# Patient Record
Sex: MALE | Race: WHITE | NOT HISPANIC OR LATINO | Employment: FULL TIME | URBAN - METROPOLITAN AREA
[De-identification: names, ages, dates, MRNs, and addresses within clinical notes are randomized per-mention and may not be internally consistent; named-entity substitution may affect disease eponyms.]

---

## 2018-01-31 DIAGNOSIS — Z00.00 ROUTINE GENERAL MEDICAL EXAMINATION AT A HEALTH CARE FACILITY: Primary | ICD-10-CM

## 2018-02-16 ENCOUNTER — CLINICAL SUPPORT (OUTPATIENT)
Dept: INTERNAL MEDICINE | Facility: CLINIC | Age: 28
End: 2018-02-16

## 2018-02-16 ENCOUNTER — CLINICAL SUPPORT (OUTPATIENT)
Dept: INFECTIOUS DISEASES | Facility: CLINIC | Age: 28
End: 2018-02-16

## 2018-02-16 ENCOUNTER — HOSPITAL ENCOUNTER (OUTPATIENT)
Dept: CARDIOLOGY | Facility: CLINIC | Age: 28
Discharge: HOME | End: 2018-02-16

## 2018-02-16 ENCOUNTER — HOSPITAL ENCOUNTER (OUTPATIENT)
Dept: RADIOLOGY | Facility: HOSPITAL | Age: 28
Discharge: HOME | End: 2018-02-16
Attending: INTERNAL MEDICINE

## 2018-02-16 ENCOUNTER — OFFICE VISIT (OUTPATIENT)
Dept: INTERNAL MEDICINE | Facility: CLINIC | Age: 28
End: 2018-02-16

## 2018-02-16 VITALS
BODY MASS INDEX: 24.33 KG/M2 | WEIGHT: 155 LBS | HEIGHT: 67 IN | OXYGEN SATURATION: 97 % | SYSTOLIC BLOOD PRESSURE: 116 MMHG | DIASTOLIC BLOOD PRESSURE: 69 MMHG | HEART RATE: 44 BPM

## 2018-02-16 DIAGNOSIS — Z00.00 ROUTINE GENERAL MEDICAL EXAMINATION AT A HEALTH CARE FACILITY: Primary | ICD-10-CM

## 2018-02-16 DIAGNOSIS — Z00.00 ROUTINE GENERAL MEDICAL EXAMINATION AT A HEALTH CARE FACILITY: ICD-10-CM

## 2018-02-16 DIAGNOSIS — Z00.00 HEALTH CARE MAINTENANCE: Primary | ICD-10-CM

## 2018-02-16 LAB
ALBUMIN SERPL BCP-MCNC: 4.2 G/DL
ALP SERPL-CCNC: 92 U/L
ALT SERPL W/O P-5'-P-CCNC: 15 U/L
ANION GAP SERPL CALC-SCNC: 8 MMOL/L
AST SERPL-CCNC: 20 U/L
BILIRUB SERPL-MCNC: 0.4 MG/DL
BUN SERPL-MCNC: 16 MG/DL
CALCIUM SERPL-MCNC: 9.9 MG/DL
CHLORIDE SERPL-SCNC: 104 MMOL/L
CHOLEST SERPL-MCNC: 171 MG/DL
CHOLEST/HDLC SERPL: 3 {RATIO}
CO2 SERPL-SCNC: 26 MMOL/L
CREAT SERPL-MCNC: 1.2 MG/DL
ERYTHROCYTE [DISTWIDTH] IN BLOOD BY AUTOMATED COUNT: 12.1 %
EST. GFR  (AFRICAN AMERICAN): >60 ML/MIN/1.73 M^2
EST. GFR  (NON AFRICAN AMERICAN): >60 ML/MIN/1.73 M^2
ESTIMATED AVG GLUCOSE: 97 MG/DL
GLUCOSE SERPL-MCNC: 96 MG/DL
HBA1C MFR BLD HPLC: 5 %
HCT VFR BLD AUTO: 41.2 %
HDLC SERPL-MCNC: 57 MG/DL
HDLC SERPL: 33.3 %
HGB BLD-MCNC: 14.3 G/DL
HIV 1+2 AB+HIV1 P24 AG SERPL QL IA: NEGATIVE
LDLC SERPL CALC-MCNC: 100 MG/DL
MCH RBC QN AUTO: 30.9 PG
MCHC RBC AUTO-ENTMCNC: 34.7 G/DL
MCV RBC AUTO: 89 FL
NONHDLC SERPL-MCNC: 114 MG/DL
PLATELET # BLD AUTO: 210 K/UL
PMV BLD AUTO: 11.1 FL
POTASSIUM SERPL-SCNC: 4.3 MMOL/L
PROT SERPL-MCNC: 7.2 G/DL
RBC # BLD AUTO: 4.63 M/UL
SODIUM SERPL-SCNC: 138 MMOL/L
TRIGL SERPL-MCNC: 70 MG/DL
WBC # BLD AUTO: 5.53 K/UL

## 2018-02-16 PROCEDURE — 99999 PR PBB SHADOW E&M-EST. PATIENT-LVL III: CPT | Mod: PBBFAC,,, | Performed by: INTERNAL MEDICINE

## 2018-02-16 PROCEDURE — 97750 PHYSICAL PERFORMANCE TEST: CPT | Mod: S$GLB,,, | Performed by: INTERNAL MEDICINE

## 2018-02-16 PROCEDURE — 71046 X-RAY EXAM CHEST 2 VIEWS: CPT | Mod: TC,FY

## 2018-02-16 PROCEDURE — 90715 TDAP VACCINE 7 YRS/> IM: CPT | Mod: ,,, | Performed by: INTERNAL MEDICINE

## 2018-02-16 PROCEDURE — 90471 IMMUNIZATION ADMIN: CPT | Mod: ,,, | Performed by: INTERNAL MEDICINE

## 2018-02-16 PROCEDURE — 85027 COMPLETE CBC AUTOMATED: CPT

## 2018-02-16 PROCEDURE — 71046 X-RAY EXAM CHEST 2 VIEWS: CPT | Mod: 26,,, | Performed by: RADIOLOGY

## 2018-02-16 PROCEDURE — 36415 COLL VENOUS BLD VENIPUNCTURE: CPT

## 2018-02-16 PROCEDURE — 80061 LIPID PANEL: CPT

## 2018-02-16 PROCEDURE — 99385 PREV VISIT NEW AGE 18-39: CPT | Mod: ,,, | Performed by: INTERNAL MEDICINE

## 2018-02-16 PROCEDURE — 97802 MEDICAL NUTRITION INDIV IN: CPT | Mod: S$GLB,,, | Performed by: INTERNAL MEDICINE

## 2018-02-16 PROCEDURE — 93000 ELECTROCARDIOGRAM COMPLETE: CPT | Mod: ,,, | Performed by: INTERNAL MEDICINE

## 2018-02-16 PROCEDURE — 86703 HIV-1/HIV-2 1 RESULT ANTBDY: CPT

## 2018-02-16 PROCEDURE — 80053 COMPREHEN METABOLIC PANEL: CPT

## 2018-02-16 PROCEDURE — 83036 HEMOGLOBIN GLYCOSYLATED A1C: CPT

## 2018-02-16 NOTE — PROGRESS NOTES
Subjective:       Patient ID: Dayron Bray is a 27 y.o. male.    Chief Complaint: No chief complaint on file.    HPI   Patient has reported no previous history of cardiovascular or pulmonary disease. No physical limitations to exercise have been reported.     Currently following Whole 30 Diet with his girlfriend. He is 26 days in and it has made recovery a lot easier.  for [employer].     Current Exercise Routine:   - Training for [employer] - March 2018   - Weight lifting 2 days per week    Review of Systems    Objective:      The fitness evaluation results are as follows:    D.O.S. 2/16/2018   Height (in): 67   Weight (lbs): 154   BMI: 24.3349187   Body Fat (%): 14.91   Waist (cm): 76   Hip (cm): 97   WHR: 0.78   RBP (mmHg): 120/70   RHR (bpm): 46    Strength R (lbs)t: 100    Strength Lt (lbs): 95   Push-up Assessment: 39   Curl-up Assessment: 24   Flexibility Testing (cm): 34   REE (kcals): 1890     Physical Exam    Assessment:      Age/Gender Stratified Assessment:     Resting BP: Within Testing Limits   Body Fat %: Very Good   WHR Risk Factor: Low Risk    Strength R: Average    Strength L: Average   Upper Body Endurance: Excellent   Abdominal Endurance: Below Average   Lower body Flexibility: Very Good     1. Routine general medical examination at a health care facility        Plan:    Patient should continue with regular exercise routine. Below guidelines should be followed.      Recommended Fitness Guidelines:   - 150 minutes of moderate intensity aerobic exercise each week OR 75 minutes of vigorous    - 2-4 days per week of resistance training for each muscle group   - Daily stretching

## 2018-02-16 NOTE — PROGRESS NOTES
"Subjective:       Patient ID: Dayron Bray is a 27 y.o. male.    Chief Complaint: Executive Health (executive health visit.)    HPI   Dayron Bray is a 27 y.o. male here for an annual executive health physical.     Labs wnl  cxr wnl    Strained left abdominal muscle - with leaning or physical activity he intermittently has very minor discomfort. No constipation.     He is training for half marathon and does gym twice a week for resistance. Currently over 15 miles/week. Generally 3-5 miles per week.    Review of Systems   Constitutional: Negative for fever.   HENT: Negative.    Eyes: Negative.    Respiratory: Negative for shortness of breath.    Cardiovascular: Negative for chest pain and leg swelling.   Gastrointestinal: Negative for abdominal pain, diarrhea, nausea and vomiting.   Genitourinary: Negative.    Musculoskeletal: Negative for arthralgias.   Skin: Negative for rash.   Psychiatric/Behavioral: Negative.        Objective:   /69 (BP Location: Left arm, Patient Position: Sitting, BP Method: Medium (Manual))   Pulse (!) 44   Ht 5' 7" (1.702 m)   Wt 70.3 kg (155 lb)   SpO2 97%   BMI 24.28 kg/m²      Physical Exam   Constitutional: He is oriented to person, place, and time. He appears well-developed and well-nourished.   HENT:   Head: Normocephalic and atraumatic.   Eyes: Conjunctivae and EOM are normal. Pupils are equal, round, and reactive to light.   Neck: Neck supple. No thyromegaly present.   Cardiovascular: Normal rate, regular rhythm and normal heart sounds.    No murmur heard.  Pulmonary/Chest: Effort normal and breath sounds normal. No respiratory distress. He has no wheezes.   Abdominal: Soft. Bowel sounds are normal. He exhibits no distension. There is no tenderness.   Musculoskeletal: Normal range of motion.   Neurological: He is alert and oriented to person, place, and time.   Skin: Skin is warm and dry. No rash noted.   Psychiatric: He has a normal mood and affect. Judgment and thought " content normal.   Vitals reviewed.      Assessment:       1. Health care maintenance        Plan:       [facility]    Diagnoses and all orders for this visit:    Health care maintenance  Labs, ekg reviewed  Sinus bradycardia due to conditioning  Continue healthy diet and exercise

## 2018-02-16 NOTE — PROGRESS NOTES
Nutrition Assessment  Client name:  Dayron Bray  :  1990  Age:  27 y.o.  Gender:  male    Client states:  Very pleasant [employer] employee here for his initial Executive Health physical.  Has an unremarkable PMH and takes no prescription medications or supplements daily.  Has been following the Whole 30 diet for the past 26 days and has since eliminated alcohol and dairy intake; limited intake of starchy grains and legumes; limited dining out to 1-2x/week; and begun planning and prepping meals weekly.  Admits that eliminating alcohol was his biggest challenge, especially during  season.  Prepares meals with his girlfriend for the week, limiting dining out to 1-2x on the weekends only.  Meals typically consist of lean protein, non-starchy vegetables, and CHO.  Limits each to a fist size equivalent.  Tracked intake via [employer] angelika in the past yet stopped as he found it overwhelming and tedious.  Cooks with olive oil, avocado oil, and coconut oil.  Has a strong family history of extensive food allergies and Celiac Disease and wonders if he should be tested.  Has not experienced any food sensitivities or adverse reactions though to date.  Maintains a physically active lifestyle and is currently training for his fourth half marathon in early March.  Plans to avoid alcohol intake until after race is completed.  Admits that he adheres to intermittent fasting, fasting ~16 hours from evening until 11-12 pm the next day.  However, is concerned that he is undereating, as he sometimes finds himself lethargic and fatigued during exercise.  Is considering reincorporating protein shakes for breakfast after completion of the Whole 30 diet.  Is health conscious overall, expressing gratitude for nutrition education and recommendations received.    No past medical history on file.    Social History    Marital status:  Single  Employment:  [employer]  Social History   Substance Use Topics    Smoking status: Not on  "file    Smokeless tobacco: Not on file    Alcohol use Not on file        Current medications:  currently has no medications in their medication list.  Vitamins, minerals, and/or supplements:  None   Food allergies or intolerances:  NKFA     Food History  Breakfast:  Coffee  Mid-morning Snack:  None  Lunch:  Lean protein + non-starchy vegetables + potatoes  Mid-afternoon Snack:  Mixed fruit/mixed nuts/RX bar  Dinner:  Same as lunch  *Beverages:  Water, tea, coffee    Exercise History:  Resistance training 2x/week + jogs 15 miles/week    Lab Reports   Total Cholesterol:  171    Triglycerides:  70  HDL:  57  LDL:  100   Glucose:  96  HbA1c:  5%  BP:  120/70 (RBP measured during fitness consult.)     Weight History  Height:  5' 7"     Weight:  154#  BMI:  24.1  % Body Fat:  14.91%    Diagnosis  RMR (Method:  Body [place]):  1890 kcal  Activity Factor:  1.4  THERESA:  2646 kcal    No nutrition-related diagnosis at this time.    Intervention    Goals:  1.  Maintain current exercise regimen of strength training 2x/week + cardio 3x/week or more as tolerated  2.  Maintain healthy weight  3.  When snacking, include a source of complex CHO and lean protein (examples provided)  4.  Avoid skipping breakfast  5.  Replace coconut oil when cooking with either olive oil, avocado oil, and/or canola oil per AND    Reviewed CMP, lipid panel, and HbA1c, praising patient for optimal lab values, physical activity, weight management, and health conscious behaviors.  Discussed the pros and cons of fad diets, cautioning patient against those eliminating entire food groups due to potential nutrient deficiencies.  Expressed concerns regarding intermittent fasting, encouraging patient to avoid skipping breakfast due to the potential of inadequate energy intake, especially given his current exercise regimen.  Discussed the importance of adequate energy intake and a healthy lifestyle overall.  Also, reviewed AND's recommendations regarding coconut oil, " advising patient to replace with olive oil, avocado oil, and/or canola oil.  Reviewed the different types of fats and food sources of each to further enhance understanding.  Also, discussed healthy snacking, including its components and examples of such.    Handouts provided:  Meal Planning Guide  Restaurant Guide  Eat Fit Shopping List  Eat Fit Kyara  Fast Food Guide  Vitamin/Mineral Guide    Monitoring/Evaluation    Monitor the following:  Weight  BMI  Caloric intake    Follow Up Plan:  Follow up with client in 1-2 years

## 2019-03-18 DIAGNOSIS — Z00.00 ROUTINE GENERAL MEDICAL EXAMINATION AT A HEALTH CARE FACILITY: Primary | ICD-10-CM

## 2019-05-24 ENCOUNTER — HOSPITAL ENCOUNTER (OUTPATIENT)
Dept: CARDIOLOGY | Facility: CLINIC | Age: 29
Discharge: HOME | End: 2019-05-24

## 2019-05-24 ENCOUNTER — CLINICAL SUPPORT (OUTPATIENT)
Dept: INTERNAL MEDICINE | Facility: CLINIC | Age: 29
End: 2019-05-24

## 2019-05-24 ENCOUNTER — OFFICE VISIT (OUTPATIENT)
Dept: PULMONOLOGY | Facility: CLINIC | Age: 29
End: 2019-05-24

## 2019-05-24 DIAGNOSIS — Z00.00 ANNUAL PHYSICAL EXAM: Primary | ICD-10-CM

## 2019-05-24 DIAGNOSIS — Z00.00 ROUTINE GENERAL MEDICAL EXAMINATION AT A HEALTH CARE FACILITY: Primary | ICD-10-CM

## 2019-05-24 DIAGNOSIS — Z00.00 ROUTINE GENERAL MEDICAL EXAMINATION AT A HEALTH CARE FACILITY: ICD-10-CM

## 2019-05-24 LAB
ALBUMIN SERPL BCP-MCNC: 4.3 G/DL (ref 3.5–5.2)
ALP SERPL-CCNC: 82 U/L (ref 55–135)
ALT SERPL W/O P-5'-P-CCNC: 34 U/L (ref 10–44)
ANION GAP SERPL CALC-SCNC: 6 MMOL/L (ref 8–16)
AST SERPL-CCNC: 41 U/L (ref 10–40)
BILIRUB SERPL-MCNC: 0.5 MG/DL (ref 0.1–1)
BUN SERPL-MCNC: 22 MG/DL (ref 6–20)
CALCIUM SERPL-MCNC: 10.2 MG/DL (ref 8.7–10.5)
CHLORIDE SERPL-SCNC: 105 MMOL/L (ref 95–110)
CHOLEST SERPL-MCNC: 182 MG/DL (ref 120–199)
CHOLEST/HDLC SERPL: 2.7 {RATIO} (ref 2–5)
CO2 SERPL-SCNC: 28 MMOL/L (ref 23–29)
CREAT SERPL-MCNC: 1.2 MG/DL (ref 0.5–1.4)
ERYTHROCYTE [DISTWIDTH] IN BLOOD BY AUTOMATED COUNT: 12 % (ref 11.5–14.5)
EST. GFR  (AFRICAN AMERICAN): >60 ML/MIN/1.73 M^2
EST. GFR  (NON AFRICAN AMERICAN): >60 ML/MIN/1.73 M^2
ESTIMATED AVG GLUCOSE: 97 MG/DL (ref 68–131)
GLUCOSE SERPL-MCNC: 85 MG/DL (ref 70–110)
HBA1C MFR BLD HPLC: 5 % (ref 4–5.6)
HCT VFR BLD AUTO: 45.4 % (ref 40–54)
HDLC SERPL-MCNC: 68 MG/DL (ref 40–75)
HDLC SERPL: 37.4 % (ref 20–50)
HGB BLD-MCNC: 15 G/DL (ref 14–18)
LDLC SERPL CALC-MCNC: 106 MG/DL (ref 63–159)
MCH RBC QN AUTO: 30.8 PG (ref 27–31)
MCHC RBC AUTO-ENTMCNC: 33 G/DL (ref 32–36)
MCV RBC AUTO: 93 FL (ref 82–98)
NONHDLC SERPL-MCNC: 114 MG/DL
PLATELET # BLD AUTO: 224 K/UL (ref 150–350)
PMV BLD AUTO: 10.5 FL (ref 9.2–12.9)
POTASSIUM SERPL-SCNC: 4.4 MMOL/L (ref 3.5–5.1)
PROT SERPL-MCNC: 7.4 G/DL (ref 6–8.4)
RBC # BLD AUTO: 4.87 M/UL (ref 4.6–6.2)
SODIUM SERPL-SCNC: 139 MMOL/L (ref 136–145)
TRIGL SERPL-MCNC: 40 MG/DL (ref 30–150)
TSH SERPL DL<=0.005 MIU/L-ACNC: 1.09 UIU/ML (ref 0.4–4)
WBC # BLD AUTO: 5.33 K/UL (ref 3.9–12.7)

## 2019-05-24 PROCEDURE — 99999 PR PBB SHADOW E&M-EST. PATIENT-LVL II: CPT | Mod: PBBFAC,,, | Performed by: INTERNAL MEDICINE

## 2019-05-24 PROCEDURE — 80053 COMPREHEN METABOLIC PANEL: CPT

## 2019-05-24 PROCEDURE — 83036 HEMOGLOBIN GLYCOSYLATED A1C: CPT

## 2019-05-24 PROCEDURE — 93005 ELECTROCARDIOGRAM TRACING: CPT | Mod: S$GLB,,, | Performed by: INTERNAL MEDICINE

## 2019-05-24 PROCEDURE — 97750 PR PHYSICAL PERFORMANCE TEST: ICD-10-PCS | Mod: S$GLB,,, | Performed by: INTERNAL MEDICINE

## 2019-05-24 PROCEDURE — 93010 EKG 12-LEAD: ICD-10-PCS | Mod: S$GLB,,, | Performed by: INTERNAL MEDICINE

## 2019-05-24 PROCEDURE — 80061 LIPID PANEL: CPT

## 2019-05-24 PROCEDURE — 99385 PR PREVENTIVE VISIT,NEW,18-39: ICD-10-PCS | Mod: S$GLB,,, | Performed by: INTERNAL MEDICINE

## 2019-05-24 PROCEDURE — 99999 PR PBB SHADOW E&M-EST. PATIENT-LVL II: ICD-10-PCS | Mod: PBBFAC,,, | Performed by: INTERNAL MEDICINE

## 2019-05-24 PROCEDURE — 36415 COLL VENOUS BLD VENIPUNCTURE: CPT

## 2019-05-24 PROCEDURE — 93010 ELECTROCARDIOGRAM REPORT: CPT | Mod: S$GLB,,, | Performed by: INTERNAL MEDICINE

## 2019-05-24 PROCEDURE — 97750 PHYSICAL PERFORMANCE TEST: CPT | Mod: S$GLB,,, | Performed by: INTERNAL MEDICINE

## 2019-05-24 PROCEDURE — 93005 EKG 12-LEAD: ICD-10-PCS | Mod: S$GLB,,, | Performed by: INTERNAL MEDICINE

## 2019-05-24 PROCEDURE — 84443 ASSAY THYROID STIM HORMONE: CPT

## 2019-05-24 PROCEDURE — 99385 PREV VISIT NEW AGE 18-39: CPT | Mod: S$GLB,,, | Performed by: INTERNAL MEDICINE

## 2019-05-24 PROCEDURE — 85027 COMPLETE CBC AUTOMATED: CPT

## 2019-05-24 NOTE — PROGRESS NOTES
Subjective:       Patient ID: Dayron Bray is a 28 y.o. male.    Chief Complaint: No chief complaint on file.    HPI   Pt. Has no significant cardiovascular or pulmonary history.    Physical Limitations:  None.      Current exercise routine:  Patient currently plays soccer for 60 minutes, once a week.  Patient performs upper and lower body resistance training exercises along with 10-15 minutes of aerobic exercise, 4 days a week.  Patient does not follow any formal flexibility routine at the current time.    Goals:  Patient would like to maintain his current weight and body fat %.    Fun Facts:  Patient was very friendly and engaged.  Patient ran the [employer] last year in under 2 hours, which was his goal.  Patient has since tapered his running regimen and has focused more on strength training.  Patient and his girlfriend follow a Paleo diet and try to cook and eat at home during the week.  Patient asked a lot of questions and was very receptive to all recommendations made.      Review of Systems    Objective:     The fitness evaluation results are as follows:  D.O.S. 5/24/2019 2/16/2018   Height (in): 67 67   Weight (lbs): 156 154   BMI: 24.980321 24.840156   Body Fat (%): 13.25 14.91   Waist (cm): 79 76   Hip (cm): 97 97   WHR: 0.81 0.78   RBP (mmHg): 120/80 120/70   RHR (bpm): 52 46    Strength R (lbs)t: 100 100    Strength Lt (lbs): 98.995350 95   Push-up Assessment: 46 39   Curl-up Assessment: 36 24   Flexibility Testing (cm): 30 34   REE (kcals): 2150 1890       Physical Exam    Assessment:     Age/gender stratified assessment:  Resting BP: Within Normal Limits   Body Fat %: Excellent   WHR Risk Factor: Low Risk    Strength R: Average    Strength L: Average   Upper Body Endurance: Excellent   Abdominal Endurance: Average   Lower body Flexibiltiy: Good       1. Routine general medical examination at a health care facility        Plan:       Recommended fitness  guidelines:    -150 minutes of moderate intensity aerobic exercise per week or 75 minutes of vigorous intensity aerobic exercise per week.  Try to reach a minimum of 10,000 steps per day.    -2 to 4 days per week of resistance training for each muscle group.  Start to incorporate the core stabilization given during the evaluation.      -Daily stretching with a hold of at least 30 seconds per muscle group.  Practice the seated hamstring stretch, demonstrated during the evaluation, daily.

## 2019-05-24 NOTE — PROGRESS NOTES
Subjective:       Patient ID: Dayron Bray is a 28 y.o. male.    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 He is a graduate of [employer] and has an electrical engineering degree from [employer]> He feels well, is an active runner, did the [employer] Race at [employer]. Takes no medications  Review of Systems   Constitutional: Negative.    HENT: Negative.    Eyes: Negative.    Respiratory: Negative.    Cardiovascular: Negative.    Gastrointestinal: Negative.    Genitourinary: Negative.    Musculoskeletal: Negative.    Skin: Negative.    Neurological: Negative.    Psychiatric/Behavioral: Negative.    All other systems reviewed and are negative.      Objective:      Physical Exam   Constitutional: He is oriented to person, place, and time. He appears well-developed and well-nourished.   HENT:   Head: Normocephalic and atraumatic.   Right Ear: External ear normal.   Left Ear: External ear normal.   Eyes: Pupils are equal, round, and reactive to light. Conjunctivae and EOM are normal.   Neck: Normal range of motion. Neck supple.   Cardiovascular: Normal rate, regular rhythm and normal heart sounds.   Pulmonary/Chest: Effort normal and breath sounds normal.   Peak flow 630 l/min    Abdominal: Soft. Bowel sounds are normal.   Musculoskeletal: Normal range of motion.   Neurological: He is alert and oriented to person, place, and time. He has normal reflexes.   Skin: Skin is warm and dry.   Psychiatric: He has a normal mood and affect. His behavior is normal. Judgment and thought content normal.       Assessment:       1. Annual physical exam        Plan:         Labs: :All parameters are normal and  EKG is normal. IMP: Healthy Male with good health habits.

## 2019-05-24 NOTE — LETTER
May 24, 2019    Dayron Bray  [address]  [facility] [address]             [facility]  [address]  [place] [address]  Phone: [phone] Dear Mr. Bray:    Thank you for allowing me to serve you and perform your Executive Health exam on 5/24/2019. This letter will serve as a brief summary of the physical findings and laboratory/studies performed and recommendations at this time. Today's assessment is normal in all respects. Keep up the running.        If you have any questions or concerns, please don't hesitate to call.    Sincerely,        Issac Morelos MD

## 2021-03-29 ENCOUNTER — IMMUNIZATION (OUTPATIENT)
Dept: PRIMARY CARE CLINIC | Facility: CLINIC | Age: 31
End: 2021-03-29
Payer: COMMERCIAL

## 2021-03-29 DIAGNOSIS — Z23 NEED FOR VACCINATION: Primary | ICD-10-CM

## 2021-03-29 PROCEDURE — 91301 PR SARS-COV-2 COVID-19 VACCINE, NO PRSV, 100MCG/0.5ML, IM: ICD-10-PCS | Mod: S$GLB,,, | Performed by: INTERNAL MEDICINE

## 2021-03-29 PROCEDURE — 0011A PR IMMUNIZ ADMIN, SARS-COV-2 COVID-19 VACC, 100MCG/0.5ML, 1ST DOSE: ICD-10-PCS | Mod: CV19,S$GLB,, | Performed by: INTERNAL MEDICINE

## 2021-03-29 PROCEDURE — 91301 PR SARS-COV-2 COVID-19 VACCINE, NO PRSV, 100MCG/0.5ML, IM: CPT | Mod: S$GLB,,, | Performed by: INTERNAL MEDICINE

## 2021-03-29 PROCEDURE — 0011A PR IMMUNIZ ADMIN, SARS-COV-2 COVID-19 VACC, 100MCG/0.5ML, 1ST DOSE: CPT | Mod: CV19,S$GLB,, | Performed by: INTERNAL MEDICINE

## 2021-03-29 RX ADMIN — Medication 0.5 ML: at 02:03

## 2021-04-28 ENCOUNTER — IMMUNIZATION (OUTPATIENT)
Dept: PRIMARY CARE CLINIC | Facility: CLINIC | Age: 31
End: 2021-04-28
Payer: COMMERCIAL

## 2021-04-28 DIAGNOSIS — Z23 NEED FOR VACCINATION: Primary | ICD-10-CM

## 2021-04-28 PROCEDURE — 91301 PR SARS-COV-2 COVID-19 VACCINE, NO PRSV, 100MCG/0.5ML, IM: CPT | Mod: S$GLB,,, | Performed by: INTERNAL MEDICINE

## 2021-04-28 PROCEDURE — 0012A PR IMMUNIZ ADMIN, SARS-COV-2 COVID-19 VACC, 100MCG/0.5ML, 2ND DOSE: CPT | Mod: CV19,S$GLB,, | Performed by: INTERNAL MEDICINE

## 2021-04-28 PROCEDURE — 91301 PR SARS-COV-2 COVID-19 VACCINE, NO PRSV, 100MCG/0.5ML, IM: ICD-10-PCS | Mod: S$GLB,,, | Performed by: INTERNAL MEDICINE

## 2021-04-28 PROCEDURE — 0012A PR IMMUNIZ ADMIN, SARS-COV-2 COVID-19 VACC, 100MCG/0.5ML, 2ND DOSE: ICD-10-PCS | Mod: CV19,S$GLB,, | Performed by: INTERNAL MEDICINE

## 2021-04-28 RX ADMIN — Medication 0.5 ML: at 04:04

## 2022-01-10 ENCOUNTER — LAB VISIT (OUTPATIENT)
Dept: PRIMARY CARE CLINIC | Facility: CLINIC | Age: 32
End: 2022-01-10
Payer: COMMERCIAL

## 2022-01-10 DIAGNOSIS — Z20.822 CONTACT WITH AND (SUSPECTED) EXPOSURE TO COVID-19: ICD-10-CM

## 2022-01-10 LAB
CTP QC/QA: YES
SARS-COV-2 AG RESP QL IA.RAPID: NEGATIVE

## 2022-01-10 PROCEDURE — 87811 SARS-COV-2 COVID19 W/OPTIC: CPT

## 2022-01-13 ENCOUNTER — LAB VISIT (OUTPATIENT)
Dept: PRIMARY CARE CLINIC | Facility: CLINIC | Age: 32
End: 2022-01-13
Payer: COMMERCIAL

## 2022-01-13 DIAGNOSIS — Z20.822 CONTACT WITH AND (SUSPECTED) EXPOSURE TO COVID-19: ICD-10-CM

## 2022-01-13 LAB
CTP QC/QA: YES
SARS-COV-2 AG RESP QL IA.RAPID: NEGATIVE

## 2022-01-13 PROCEDURE — 87811 SARS-COV-2 COVID19 W/OPTIC: CPT
